# Patient Record
Sex: MALE | Race: WHITE | HISPANIC OR LATINO | URBAN - METROPOLITAN AREA
[De-identification: names, ages, dates, MRNs, and addresses within clinical notes are randomized per-mention and may not be internally consistent; named-entity substitution may affect disease eponyms.]

---

## 2021-03-30 ENCOUNTER — EMERGENCY (EMERGENCY)
Facility: HOSPITAL | Age: 21
LOS: 1 days | Discharge: HOME | End: 2021-03-30
Attending: EMERGENCY MEDICINE | Admitting: EMERGENCY MEDICINE
Payer: MEDICARE

## 2021-03-30 VITALS
DIASTOLIC BLOOD PRESSURE: 72 MMHG | SYSTOLIC BLOOD PRESSURE: 113 MMHG | OXYGEN SATURATION: 100 % | WEIGHT: 149.91 LBS | RESPIRATION RATE: 15 BRPM | HEIGHT: 67 IN | HEART RATE: 74 BPM | TEMPERATURE: 98 F

## 2021-03-30 PROCEDURE — 99053 MED SERV 10PM-8AM 24 HR FAC: CPT

## 2021-03-30 PROCEDURE — 99282 EMERGENCY DEPT VISIT SF MDM: CPT

## 2021-03-30 PROCEDURE — U0005: CPT

## 2021-03-30 PROCEDURE — 99283 EMERGENCY DEPT VISIT LOW MDM: CPT

## 2021-03-30 PROCEDURE — U0003: CPT

## 2021-03-30 NOTE — ED PROVIDER NOTE - PATIENT PORTAL LINK FT
You can access the FollowMyHealth Patient Portal offered by [facility] by registering at the following website: [email or web address]. By joining "[employer]’s FollowMyHealth portal, you will also be able to view your health information using other applications (apps) compatible with our system.

## 2021-03-30 NOTE — ED ADULT TRIAGE NOTE - WEIGHT IN LBS
ANESTHESIA INTUBATION  Urgency: elective    Airway not difficult    General Information and Staff    Patient location during procedure: OR  Anesthesiologist: Mian Solitario MD  CRNA: Irina López CRNA    Indications and Patient Condition  Indications for airway management: airway protection    Preoxygenated: yes  MILS maintained throughout  Mask difficulty assessment: 1 - vent by mask    Final Airway Details  Final airway type: endotracheal airway      Successful airway: ETT  Cuffed: yes   Successful intubation technique: direct laryngoscopy  Facilitating devices/methods: anterior pressure/BURP, intubating stylet and cricoid pressure  Endotracheal tube insertion site: oral  Blade: Soliz  Blade size: 2  ETT size (mm): 7.5  Cormack-Lehane Classification: grade IIa - partial view of glottis  Placement verified by: chest auscultation   Cuff volume (mL): 8  Measured from: lips  ETT to lips (cm): 23  Number of attempts at approach: 1    Additional Comments  Pt preoxygenated, SIVI, bag mask vent, ATETI, dentition as before             149.9

## 2021-03-30 NOTE — ED PROVIDER NOTE - NSFOLLOWUPINSTRUCTIONS_ED_ALL_ED_FT
1. You were seen in the ED and underwent testing for the novel coronarvirus (COVID-19). The results are not back yet and you will be contacted with the result in 5-7 days, but may take longer. You were also tested for other common viruses such as the flu and cold viruses. You will be notified if you test positive for any of these.    2. Until your test results are back, YOU MUST SELF-QUARANTINE until you are told to other otherwise by [facility] or the local [facility] department. This is extremely important to limit the spread of this virus. Please refer closely to the packet provided to you on the specifics of the process of self-quarantine.    3. If you end up testing positive for the virus, you will instructed as to when you can return to your usual activities. If you do not hear from anyone in 7 days, call [phone].     4. Return to the ED for difficulty breathing.    5. You may take over the counter acetaminophen (Tylenol) 650mg every 6 hours as needed for fever or pain. There is some concern in the medical community about using ibuprofen (Advil, Motrin) and other NSAIDs in people with COVID infections and until there is more research on this subject it may be best to avoid NSAIDs like ibuprofen, unless you have an allergy to acetaminophen (Tylenol).  Do NOT exceed 3500mg acetaminophen in 24 hours.  Please do not take these medications if you do not have pain or fever or if you have any history of liver disease.     -------------    What is a coronavirus?  Coronaviruses are a large family of viruses that cause illnesses ranging from the common cold to more severe diseases such as Middle East Respiratory Syndrome (MERS) and Severe Acute Respiratory Syndrome (SARS).    What is Novel Coronavirus (COVID-19)?  The Centers for Disease Control and Prevention (CDC) is closely monitoring the outbreak caused by COVID-19. For the latest information about COVID-19, visit the CDC website at CDC.gov/Coronavirus    How are coronaviruses spread?  Coronaviruses can be transmitted from person-toperson, usually after close contact with an infected  person (for example, in a household, workplace, or healthcare setting), via droplets that become airborne after a cough or sneeze. These droplets can then infect a nearby person. Transmission can also occur by touching recently contaminated surfaces.    Is there a treatment for a COVID-19?  There is no specific treatment for disease caused by COVID-19. However, many of the symptoms can be treated based on the patient’s clinical condition. Supportive care for infected persons can be highly effective.    What are the symptoms of coronavirus infection?  It depends on the virus, but common signs include fever and/or respiratory symptoms such as cough and shortness of breath. In more severe cases, infection can cause pneumonia, severe acute respiratory syndrome, kidney failure and even death. Fortunately, most cases of COVID-19 have an illness no different than the influenza (flu), with a majority of these patients having mild symptoms and overall mortality which appears to be not much different than the flu.    What can I do to protect myself?  The best precautionary measures:  – washing your hands  – covering your cough  – disinfecting surfaces  – it is also advisable to avoid close contact with anyone showing symptoms of respiratory illness such as coughing and sneezing  – those with symptoms should wear a surgical mask when around others    What can I do to protect those around me?  If you have been identified as someone who may be infected with COVID-19, we recommend you follow the self-isolation procedures outlined on the following page to protect those around you and to limit the spread of this virus.    We recommend the below precautionary steps from now until 14 days from when you returned from your travel or date of your last known possible contact:    — Do not go to work, school or public areas. Avoid using public transportation, ridesharing or taxis.  — As much as possible, separate yourself from other people in your home. If you can, you should stay in a room and away from other people. Also, you should use a separate bathroom if available.  — Wear the supplied mask whenever you are around other people.  — If you have a non-urgent medical appointment, please reschedule for a later date. If the appointment is urgent, please call the health care provider and tell them that you are on self-isolation for possible COVID-19. This will help the health care provider’s office take steps to keep other people from getting infected or exposed. If you can reschedule routine appointments, do so.  — Wash your hands often with soap and water for at least 15 to 20 seconds or clean your hands with an alcohol-based hand  that contains 60 to 95% alcohol, covering all surfaces of your hands and rubbing them together until they feel dry. Soap and water should be used preferentially if hands are visibly dirty.  — Cover your mouth and nose with a tissue when you cough or sneeze. Throw used tissues in a lined trash can. Immediately wash your hands.  — Avoid touching your eyes, nose, and mouth with your hands.  — Avoid sharing personal household items. You should not share dishes, drinking glasses, cups, eating utensils, towels, or bedding with other people or pets in your home. After using these items, they should be washed thoroughly with soap and water.  — Clean and disinfect all “high-touch” surfaces every day. High touch surfaces include counters, tabletops, doorknobs, light switches, remote controls, bathroom fixtures, toilets, phones, keyboards, tablets, and bedside tables. Also, clean any surfaces that may have blood, stool, or body fluids on them.    ------------------------------------------  Information for patients who have received a COVID-19 test.    The COVID-19 (novel coronavirus) test  Results may take up to 5-7 days to become available.      If your result is positive, you will receive a phone call from one of our coronavirus specialists. While we will do our best to also call patients with a negative test result, the sheer volume of tests being performed may make this difficult to do in a timely fashion. If you haven’t heard from us within 5 days and you’d like to check on your results, you can check our [employer] [facility] or call one of our coronavirus specialists at [address] [place] [address] (available 24/7)    Please DO NOT call the site where you received the test to obtain your results.    If the test is positive -   You will continue home isolation until you are completely well, you have no fever, and it has been at least 14 days since your positive test. The health department in your city or county may also contact you with additional instructions.    If your test is negative -    You will be able to stop home isolation and resume standard precautions, similiar to how you would manage the common cold or flu.  If you have any questions, you can reach out to one of our coronavirus specialists  at [address] [place] [address].    REMEMBER - a negative COVID test means you were negative AT THE TIME OF TESTING, and it is possible to have contracted COVID after being tested.        CORONAVIRUS DISCHARGE INSTRUCTIONS  COVID-19 (Coronavirus Disease 2019)    WHAT YOU NEED TO KNOW:    COVID-19 is the disease caused by the 2019 novel (new) coronavirus. It was first found in individuals in a part of [place] in late 2019. The virus is spreading to other countries as infected persons travel. Coronaviruses generally cause respiratory (nose, throat, and lung) infections, such as a cold. They can also cause serious infections such as Middle East respiratory syndrome (MERS) and severe acute respiratory syndrome (SARS). The new virus is related to the SARS coronavirus, so it is officially named SARS coronavirus 2 (SARS-CoV-2).    DISCHARGE INSTRUCTIONS:    If you think you or someone you know may be infected: It is important for anyone who may be infected to get tested right away. Do the following to protect others:     If emergency care is needed, tell the  about the possible infection, or call ahead and tell the emergency department.      Call a healthcare provider to be seen in the office. Anyone who may be infected should not arrive without calling first. The provider will need to protect staff members and other patients.      The person who may be infected needs to wear a medical mask before getting medical care. The mask needs to stay on until the provider says to remove it.    Call your local emergency number (911 in the ) or go to the emergency department if: You have signs or symptoms of COVID-19, and any of the following is true:     You traveled within the last 14 days to an area where the virus is active or had close contact with someone who did.      You had close contact within the last 14 days with someone who has a confirmed infection.    Call your doctor if: You do not have signs or symptoms of COVID-19, but any of the following is true:     You traveled within the last 14 days to an area where the virus is active or had close contact with someone who did.      You had close contact within the last 14 days with someone who has a confirmed infection.      You have questions or concerns about your condition or care.    Medicines: You may need any of the following for mild symptoms:     Decongestants help reduce nasal congestion and help you breathe more easily. If you take decongestant pills, they may make you feel restless or cause problems with your sleep. Do not use decongestant sprays for more than a few days.      Cough suppressants help reduce coughing. Ask your healthcare provider which type of cough medicine is best for you.      Acetaminophen decreases pain and fever. It is available without a doctor's order. Ask how much to take and how often to take it. Follow directions. Read the labels of all other medicines you are using to see if they also contain acetaminophen, or ask your doctor or pharmacist. Acetaminophen can cause liver damage if not taken correctly. Do not use more than 4 grams (4,000 milligrams) total of acetaminophen in one day.     Take your medicine as directed. Contact your healthcare provider if you think your medicine is not helping or if you have side effects. Tell him or her if you are allergic to any medicine. Keep a list of the medicines, vitamins, and herbs you take. Include the amounts, and when and why you take them. Bring the list or the pill bottles to follow-up visits. Carry your medicine list with you in case of an emergency.    How the 2019 coronavirus spreads: The virus appears to spread quickly and easily. The following are ways the virus is thought to spread, but more information may be coming:     Droplets are the most common way all coronaviruses spread. The virus can travel in droplets that form when a person talks, coughs, or sneezes. Anyone who breathes in the virus or gets it in his or her eyes can become infected.      An infected person may be able to leave the virus on objects and surfaces. Another person can get the virus on his or her hands by touching the object or surface. Infection happens if the person then touches his or her eyes or mouth with unwashed hands. It is not yet known how long the virus can stay on an object or surface. Evidence shows it may be as long as 9 days at room temperature.      Person-to-person contact may spread the virus. For example, an infected person can spread the virus by shaking hands with someone. At this time, it does not appear that the virus can be passed to a baby during pregnancy or delivery. The virus also does not appear to spread during breastfeeding. If you are pregnant or breastfeeding, talk to your healthcare provider or obstetrician about any concerns you have.      An infected animal may be able to infect a person who touches it. This may happen at live markets or on a farm.    Prevent a 2019 coronavirus infection: Everyone should do the following to prevent getting or spreading the virus:     Wash your hands often. Use soap and water every time you wash your hands. Rub your soapy hands together, lacing your fingers. Use the fingers of one hand to scrub under the nails of the other hand. Wash for at least 20 seconds. Rinse with warm, running water for several seconds. Then dry your hands. Use germ-killing gel if soap and water are not available. Do not touch your eyes or mouth without washing your hands first. Handwashing           Cover a sneeze or cough. Use a tissue that covers your mouth and nose. Throw the tissue away in a trash can right away. Use the bend of your arm if a tissue is not available. Wash your hands well with soap and water or use a hand . Do not stand close to anyone who is sneezing or coughing.      Be careful around others. The best way to prevent infection is to avoid anyone who is infected, but this may be difficult. An infected person may be able to spread the virus before signs or symptoms begin. Until more is known, you may not want to shake hands with others. If you do shake hands, wash your hands or use hand  as soon as possible. You do not need to wear a medical mask if you are well and not caring for an infected person.      Ask about vaccines you may need. No vaccine is available for the new coronavirus. But any infection can affect your immune system. A weakened immune system makes you more vulnerable to the new coronavirus. Until a vaccine against the new virus is developed, do the following:   Get a flu vaccine as soon as recommended each year. The flu vaccine is available starting in September or October. Flu viruses change, so it is important to get a flu vaccine every year.      Talk to your healthcare provider about your vaccine history. Tell him or her if you did not get certain vaccines as a child, or you did not get all recommended doses. Tell him or her if you do not know your vaccine history. He or she will tell you which vaccines you need, and when to get them.           If you have COVID-19: Healthcare providers will give you specific instructions to follow. The following are general guidelines to remind you of how to keep others safe until you are well:     Limit close contact with others. Your healthcare provider will tell you when it is okay to be around others. This may be when you do not have a fever, do not take fever medicine, and have no symptoms. Fluid from your respiratory tract will need to test negative for the virus 2 times at least 24 hours apart. Until then, do the following along with any instructions from your provider:   Only go out of the house for medical appointments. Always call the provider’s office first so he or she can prepare to keep others safe.      Stay at least 6 feet (2 meters) away from others.      Sleep in a different room from others in the house.      Do not shake hands with other people.      Wear a medical mask when others are near you. This can help prevent droplets from spreading the virus when you talk, sneeze, or cough.      Do not share items. Do not share dishes, towels, or other items with anyone. Items need to be washed after you use them.      Do not handle live animals. The virus may be spread to animals, including pets. Until more is known, it is best not to touch, play with, or handle live animals.    Self-care:     Drink more liquids as directed. Liquids will help thin and loosen mucus so you can cough it up. Liquids will also help prevent dehydration. Liquids that help prevent dehydration include water, fruit juice, and broth. Do not drink liquids that contain caffeine. Caffeine can increase your risk for dehydration. Ask your healthcare provider how much liquid to drink each day.      Soothe a sore throat. Gargle with warm salt water. This may help your sore throat feel better. Make salt water by dissolving ¼ teaspoon salt in 1 cup warm water. You may also suck on hard candy or throat lozenges. You may use a sore throat spray.      Use a humidifier or vaporizer. Use a cool mist humidifier or a vaporizer to increase air moisture in your home. This may make it easier for you to breathe and help decrease your cough.      Use saline nasal drops as directed. These help relieve congestion.      Apply petroleum-based jelly around the outside of your nostrils. This can decrease irritation from blowing your nose.      Do not smoke. Nicotine and other chemicals in cigarettes and cigars can make your symptoms worse. They can also cause infections such as bronchitis or pneumonia. Ask your healthcare provider for information if you currently smoke and need help to quit. E-cigarettes or smokeless tobacco still contain nicotine. Talk to your healthcare provider before you use these products.    If you take care of someone who has COVID-19:     Wear a medical mask when you are near the person. This can help protect you from droplets that carry the virus when the person talks, sneezes, or coughs.      Do not allow others to go near the person. No one should come to the person's home unless it is necessary. Keep the room's door shut unless you need to go in or out.      Make sure the person's room has good air flow. You may be able to open the window if the weather allows. An air conditioner can also be turned on to help air move.      Clean items the person uses or touches. Wear disposable gloves to handle the person's laundry. Place the laundry in a plastic bag. Use hot water and soap to wash the person's laundry. Wash eating utensils and other items after the person uses them. Throw your gloves away after you use them.      Clean surfaces often. Use bleach diluted with water or disinfecting wipes. Clean counters, doorknobs, toilet seats, and other surfaces.    Follow up with your doctor as directed: Write down your questions so you remember to ask them during your visits.    For more information:     Centers for Disease Control and Prevention  [address] [place], [place]  Phone: [phone]  Phone: 1-816-1205518  Web Address: http://www.cdc.gov

## 2021-03-30 NOTE — ED PROVIDER NOTE - OBJECTIVE STATEMENT
19 y/o male presented requesting COVID test because his brother was tested positive yesterday , denies any symptoms

## 2021-03-31 LAB — SARS-COV-2 RNA SPEC QL NAA+PROBE: SIGNIFICANT CHANGE UP

## 2022-02-05 NOTE — ED ADULT NURSE NOTE - NSSUHOSCREENINGYN_ED_ALL_ED
Problem: Altered Mood, Depressive Behavior:  Goal: Ability to disclose and discuss suicidal ideas will improve  Description: Ability to disclose and discuss suicidal ideas will improve  2/4/2022 2340 by Christa Woodruff RN  Outcome: Ongoing   Patient denies suicidal ideations. Patient agrees to notify staff if symptoms arise. Patient remains safe on unit. Patient safety maintained q15 minute checks. Problem: Altered Mood, Depressive Behavior:  Goal: Able to verbalize and/or display a decrease in depressive symptoms  Description: Able to verbalize and/or display a decrease in depressive symptoms  2/4/2022 2340 by Christa Woodruff RN  Outcome: Ongoing   Patient reports depression and anxiety. Patient is isolative to his room throughout shift. Patient is compliant with his medications. Yes - the patient is able to be screened

## 2025-02-01 NOTE — ED ADULT TRIAGE NOTE - CCCP TRG CHIEF CMPLNT
Bed/Stretcher in lowest position, wheels locked, appropriate side rails in place/Call bell, personal items and telephone in reach/Instruct patient to call for assistance before getting out of bed/chair/stretcher/Non-slip footwear applied when patient is off stretcher/[place] to call system/Physically safe environment - no spills, clutter or unnecessary equipment/Purposeful proactive rounding/Room/bathroom lighting operational, light cord in reach
medical evaluation